# Patient Record
Sex: FEMALE | Race: WHITE | URBAN - METROPOLITAN AREA
[De-identification: names, ages, dates, MRNs, and addresses within clinical notes are randomized per-mention and may not be internally consistent; named-entity substitution may affect disease eponyms.]

---

## 2017-08-14 ENCOUNTER — OFFICE VISIT (OUTPATIENT)
Dept: FAMILY MEDICINE CLINIC | Age: 42
End: 2017-08-14

## 2017-08-14 VITALS
SYSTOLIC BLOOD PRESSURE: 132 MMHG | BODY MASS INDEX: 38.99 KG/M2 | HEIGHT: 65 IN | WEIGHT: 234 LBS | HEART RATE: 70 BPM | DIASTOLIC BLOOD PRESSURE: 84 MMHG | OXYGEN SATURATION: 98 %

## 2017-08-14 DIAGNOSIS — E66.01 MORBID OBESITY DUE TO EXCESS CALORIES (HCC): ICD-10-CM

## 2017-08-14 DIAGNOSIS — Z00.00 WELL ADULT EXAM: Primary | ICD-10-CM

## 2017-08-14 DIAGNOSIS — E78.5 HYPERLIPIDEMIA, UNSPECIFIED HYPERLIPIDEMIA TYPE: ICD-10-CM

## 2017-08-14 PROCEDURE — 99396 PREV VISIT EST AGE 40-64: CPT | Performed by: FAMILY MEDICINE

## 2017-08-14 RX ORDER — SIMVASTATIN 20 MG
TABLET ORAL
Qty: 90 TABLET | Refills: 3 | Status: SHIPPED | OUTPATIENT
Start: 2017-08-14 | End: 2018-10-08 | Stop reason: SDUPTHER

## 2017-08-14 ASSESSMENT — PATIENT HEALTH QUESTIONNAIRE - PHQ9
1. LITTLE INTEREST OR PLEASURE IN DOING THINGS: 0
SUM OF ALL RESPONSES TO PHQ9 QUESTIONS 1 & 2: 0
SUM OF ALL RESPONSES TO PHQ QUESTIONS 1-9: 0
2. FEELING DOWN, DEPRESSED OR HOPELESS: 0

## 2017-09-15 RX ORDER — SIMVASTATIN 20 MG
TABLET ORAL
Qty: 90 TABLET | Refills: 0 | OUTPATIENT
Start: 2017-09-15

## 2018-06-21 ENCOUNTER — TELEPHONE (OUTPATIENT)
Dept: FAMILY MEDICINE CLINIC | Age: 43
End: 2018-06-21

## 2018-06-21 DIAGNOSIS — Z12.39 SCREENING BREAST EXAMINATION: ICD-10-CM

## 2018-09-22 ENCOUNTER — OFFICE VISIT (OUTPATIENT)
Dept: FAMILY MEDICINE CLINIC | Age: 43
End: 2018-09-22
Payer: COMMERCIAL

## 2018-09-22 VITALS
OXYGEN SATURATION: 98 % | WEIGHT: 238 LBS | HEART RATE: 70 BPM | DIASTOLIC BLOOD PRESSURE: 70 MMHG | SYSTOLIC BLOOD PRESSURE: 122 MMHG | BODY MASS INDEX: 40.22 KG/M2

## 2018-09-22 DIAGNOSIS — H65.02 ACUTE SEROUS OTITIS MEDIA OF LEFT EAR, RECURRENCE NOT SPECIFIED: Primary | ICD-10-CM

## 2018-09-22 PROCEDURE — 99213 OFFICE O/P EST LOW 20 MIN: CPT | Performed by: FAMILY MEDICINE

## 2018-09-22 RX ORDER — GUAIFENESIN, PSEUDOEPHEDRINE HYDROCHLORIDE 600; 60 MG/1; MG/1
1 TABLET, EXTENDED RELEASE ORAL EVERY 12 HOURS
Qty: 20 TABLET | Refills: 1 | Status: SHIPPED | OUTPATIENT
Start: 2018-09-22 | End: 2018-10-02

## 2018-09-22 RX ORDER — PREDNISONE 10 MG/1
TABLET ORAL
Qty: 30 TABLET | Refills: 0 | Status: SHIPPED | OUTPATIENT
Start: 2018-09-22 | End: 2018-10-08

## 2018-09-22 ASSESSMENT — ENCOUNTER SYMPTOMS
SORE THROAT: 0
RHINORRHEA: 1

## 2018-10-08 ENCOUNTER — OFFICE VISIT (OUTPATIENT)
Dept: FAMILY MEDICINE CLINIC | Age: 43
End: 2018-10-08
Payer: COMMERCIAL

## 2018-10-08 VITALS
DIASTOLIC BLOOD PRESSURE: 78 MMHG | HEIGHT: 64 IN | BODY MASS INDEX: 40.97 KG/M2 | HEART RATE: 99 BPM | SYSTOLIC BLOOD PRESSURE: 124 MMHG | WEIGHT: 240 LBS | OXYGEN SATURATION: 99 %

## 2018-10-08 DIAGNOSIS — E78.2 MIXED HYPERLIPIDEMIA: ICD-10-CM

## 2018-10-08 DIAGNOSIS — Z00.00 WELL ADULT EXAM: Primary | ICD-10-CM

## 2018-10-08 DIAGNOSIS — E66.01 CLASS 3 SEVERE OBESITY DUE TO EXCESS CALORIES WITHOUT SERIOUS COMORBIDITY WITH BODY MASS INDEX (BMI) OF 40.0 TO 44.9 IN ADULT (HCC): ICD-10-CM

## 2018-10-08 DIAGNOSIS — H69.82 DYSFUNCTION OF LEFT EUSTACHIAN TUBE: ICD-10-CM

## 2018-10-08 PROBLEM — E66.813 CLASS 3 SEVERE OBESITY DUE TO EXCESS CALORIES WITHOUT SERIOUS COMORBIDITY WITH BODY MASS INDEX (BMI) OF 40.0 TO 44.9 IN ADULT: Status: ACTIVE | Noted: 2017-08-14

## 2018-10-08 PROCEDURE — 99396 PREV VISIT EST AGE 40-64: CPT | Performed by: FAMILY MEDICINE

## 2018-10-08 RX ORDER — SIMVASTATIN 20 MG
TABLET ORAL
Qty: 90 TABLET | Refills: 3 | Status: SHIPPED | OUTPATIENT
Start: 2018-10-08 | End: 2019-10-22 | Stop reason: SDUPTHER

## 2018-10-08 ASSESSMENT — PATIENT HEALTH QUESTIONNAIRE - PHQ9
SUM OF ALL RESPONSES TO PHQ9 QUESTIONS 1 & 2: 0
SUM OF ALL RESPONSES TO PHQ QUESTIONS 1-9: 0
1. LITTLE INTEREST OR PLEASURE IN DOING THINGS: 0
SUM OF ALL RESPONSES TO PHQ QUESTIONS 1-9: 0
2. FEELING DOWN, DEPRESSED OR HOPELESS: 0

## 2018-10-08 NOTE — PATIENT INSTRUCTIONS
running, swimming, cycling, or playing tennis or team sports. Discuss any changes in your exercise program with your doctor. · Do not smoke or allow others to smoke around you. If you need help quitting, talk to your doctor about stop-smoking programs and medicines. These can increase your chances of quitting for good. · Talk to your doctor about whether you have any risk factors for sexually transmitted infections (STIs). Having one sex partner (who does not have STIs and does not have sex with anyone else) is a good way to avoid these infections. · Use birth control if you do not want to have children at this time. Talk with your doctor about the choices available and what might be best for you. · Protect your skin from too much sun. When you're outdoors from 10 a.m. to 4 p.m., stay in the shade or cover up with clothing and a hat with a wide brim. Wear sunglasses that block UV rays. Even when it's cloudy, put broad-spectrum sunscreen (SPF 30 or higher) on any exposed skin. · See a dentist one or two times a year for checkups and to have your teeth cleaned. · Wear a seat belt in the car. · Drink alcohol in moderation, if at all. That means no more than 2 drinks a day for men and 1 drink a day for women. Follow your doctor's advice about when to have certain tests. These tests can spot problems early. For everyone  · Cholesterol. Have the fat (cholesterol) in your blood tested after age 21. Your doctor will tell you how often to have this done based on your age, family history, or other things that can increase your risk for heart disease. · Blood pressure. Have your blood pressure checked during a routine doctor visit. Your doctor will tell you how often to check your blood pressure based on your age, your blood pressure results, and other factors. · Vision. Talk with your doctor about how often to have a glaucoma test.  · Diabetes. Ask your doctor whether you should have tests for diabetes.   · Colon cancer. Have a test for colon cancer at age 48. You may have one of several tests. If you are younger than 48, you may need a test earlier if you have any risk factors. Risk factors include whether you already had a precancerous polyp removed from your colon or whether your parent, brother, sister, or child has had colon cancer. For women  · Breast exam and mammogram. Talk to your doctor about when you should have a clinical breast exam and a mammogram. Medical experts differ on whether and how often women under 50 should have these tests. Your doctor can help you decide what is right for you. · Pap test and pelvic exam. Begin Pap tests at age 24. A Pap test is the best way to find cervical cancer. The test often is part of a pelvic exam. Ask how often to have this test.  · Tests for sexually transmitted infections (STIs). Ask whether you should have tests for STIs. You may be at risk if you have sex with more than one person, especially if your partners do not wear condoms. For men  · Tests for sexually transmitted infections (STIs). Ask whether you should have tests for STIs. You may be at risk if you have sex with more than one person, especially if you do not wear a condom. · Testicular cancer exam. Ask your doctor whether you should check your testicles regularly. · Prostate exam. Talk to your doctor about whether you should have a blood test (called a PSA test) for prostate cancer. Experts differ on whether and when men should have this test. Some experts suggest it if you are older than 39 and are -American or have a father or brother who got prostate cancer when he was younger than 72. When should you call for help? Watch closely for changes in your health, and be sure to contact your doctor if you have any problems or symptoms that concern you. Where can you learn more? Go to https://tavo.health-[email or web address]. org and sign in to your Simbionix account.  Enter P072 in the [address] Information box to learn more about \"Well Visit, Ages 25 to 48: Care Instructions. \"     If you do not have an account, please click on the \"Sign Up Now\" link. Current as of: May 16, 2017  Content Version: 11.7  © 3020-1134 [employer]. Care instructions adapted under license by [employer] [facility] ([facility]). If you have questions about a medical condition or this instruction, always ask your healthcare professional. [place] [address] any warranty or liability for your use of this information.        ENT Specialists:    Dr. Robson Charles  [phone]  [address] [facility] [address]    Dr. Edwin Lancaster  [address] [facility] Jaison Motlisa, [address]    Dr. Majo Rogers  [address] [place] [address]. [address]   [phone]    Gilbert Cline, [address] Bertha Malcolm MD  [facility]  [phone] [address] [facility]  [phone] ([facility] 2)    1 [facility])  [address] [facility] [address]  [facility], [address]  Phone: [phone]

## 2018-10-08 NOTE — PROGRESS NOTES
exam  -Encourage low carb diet, watching calories, regular exercise and weight loss  UTD rec screenings    Mixed hyperlipidemia  -     Lipid Panel; Future  -     Comprehensive Metabolic Panel; Future  -Stable, continue current medications. Recheck labs    Class 3 severe obesity due to excess calories without serious comorbidity with body mass index (BMI) of 40.0 to 44.9 in adult [facility])  -Encourage low carb diet, watching calories, regular exercise and weight loss    Dysfunction of left eustachian tube  Give another week or so, if not improving see ENT, names given    Other orders  -     simvastatin (ZOCOR) 20 MG tablet; TAKE 1 TABLET BY MOUTH EVERY NIGHT            Return in about 1 year (around 10/8/2019).

## 2019-04-01 ENCOUNTER — OFFICE VISIT (OUTPATIENT)
Dept: FAMILY MEDICINE CLINIC | Age: 44
End: 2019-04-01
Payer: COMMERCIAL

## 2019-04-01 VITALS
HEART RATE: 81 BPM | BODY MASS INDEX: 42.75 KG/M2 | DIASTOLIC BLOOD PRESSURE: 80 MMHG | WEIGHT: 251 LBS | OXYGEN SATURATION: 97 % | SYSTOLIC BLOOD PRESSURE: 126 MMHG

## 2019-04-01 DIAGNOSIS — D48.9 NEOPLASM, UNCERTAIN WHETHER BENIGN OR MALIGNANT: Primary | ICD-10-CM

## 2019-04-01 PROCEDURE — 99212 OFFICE O/P EST SF 10 MIN: CPT | Performed by: FAMILY MEDICINE

## 2019-04-01 ASSESSMENT — PATIENT HEALTH QUESTIONNAIRE - PHQ9
2. FEELING DOWN, DEPRESSED OR HOPELESS: 0
SUM OF ALL RESPONSES TO PHQ9 QUESTIONS 1 & 2: 0
SUM OF ALL RESPONSES TO PHQ QUESTIONS 1-9: 0
SUM OF ALL RESPONSES TO PHQ QUESTIONS 1-9: 0
1. LITTLE INTEREST OR PLEASURE IN DOING THINGS: 0

## 2019-04-01 NOTE — PROGRESS NOTES
Patient presents today for a skin lesion on the right forearm. Noticed 3 weeks ago, started out as 2 small bumps and now combined to one larger lesion. Itches sometimes not all the time, no burning. States a little bigger than was when separate but not sig different. NKI. No drainage or pain. Vitals:    04/01/19 1647   BP: 126/80   Site: Left Upper Arm   Position: Sitting   Cuff Size: Large Adult   Pulse: 81   SpO2: 97%   Weight: 251 lb (113.9 kg)     Wt Readings from Last 3 Encounters:   04/01/19 251 lb (113.9 kg)   10/08/18 240 lb (108.9 kg)   09/22/18 238 lb (108 kg)     Body mass index is 42.75 kg/m². PHQ-9 Total Score: 0 (4/1/2019  4:47 PM)      GEN: Alert and oriented x 4 NAD, affect appropriate and obese, well hydrated, well developed. Right mid forearm with 1 cm round but irreg shaped raised slightly soft erythematous lesion, NT  See pictures in media          ASSESSMENT AND PLAN:       Jamir Espinosa was seen today for skin lesion.     Diagnoses and all orders for this visit:    Neoplasm, uncertain whether benign or malignant      Monitor for changes  Make appt with derm  If changing size rapidly let us know

## 2019-09-18 ENCOUNTER — TELEPHONE (OUTPATIENT)
Dept: FAMILY MEDICINE CLINIC | Age: 44
End: 2019-09-18

## 2019-10-08 ENCOUNTER — TELEPHONE (OUTPATIENT)
Dept: FAMILY MEDICINE CLINIC | Age: 44
End: 2019-10-08

## 2019-10-08 DIAGNOSIS — E78.2 MIXED HYPERLIPIDEMIA: Primary | ICD-10-CM

## 2019-10-22 ENCOUNTER — OFFICE VISIT (OUTPATIENT)
Dept: FAMILY MEDICINE CLINIC | Age: 44
End: 2019-10-22
Payer: COMMERCIAL

## 2019-10-22 VITALS
DIASTOLIC BLOOD PRESSURE: 84 MMHG | OXYGEN SATURATION: 97 % | HEART RATE: 68 BPM | WEIGHT: 239 LBS | BODY MASS INDEX: 39.82 KG/M2 | SYSTOLIC BLOOD PRESSURE: 130 MMHG | HEIGHT: 65 IN

## 2019-10-22 DIAGNOSIS — H92.02 LEFT EAR PAIN: ICD-10-CM

## 2019-10-22 DIAGNOSIS — Z00.00 WELL ADULT EXAM: Primary | ICD-10-CM

## 2019-10-22 DIAGNOSIS — E78.2 MIXED HYPERLIPIDEMIA: ICD-10-CM

## 2019-10-22 PROCEDURE — 99396 PREV VISIT EST AGE 40-64: CPT | Performed by: FAMILY MEDICINE

## 2019-10-22 RX ORDER — SIMVASTATIN 20 MG
TABLET ORAL
Qty: 90 TABLET | Refills: 3 | Status: SHIPPED | OUTPATIENT
Start: 2019-10-22 | End: 2020-10-26 | Stop reason: SDUPTHER

## 2019-10-22 ASSESSMENT — PATIENT HEALTH QUESTIONNAIRE - PHQ9
SUM OF ALL RESPONSES TO PHQ QUESTIONS 1-9: 0
SUM OF ALL RESPONSES TO PHQ QUESTIONS 1-9: 0
1. LITTLE INTEREST OR PLEASURE IN DOING THINGS: 0
SUM OF ALL RESPONSES TO PHQ9 QUESTIONS 1 & 2: 0
2. FEELING DOWN, DEPRESSED OR HOPELESS: 0

## 2020-10-08 LAB
HPV COMMENT: NORMAL
HPV TYPE 16: NOT DETECTED
HPV TYPE 18: NOT DETECTED
HPVOH (OTHER TYPES): NOT DETECTED

## 2020-10-26 ENCOUNTER — OFFICE VISIT (OUTPATIENT)
Dept: FAMILY MEDICINE CLINIC | Age: 45
End: 2020-10-26
Payer: COMMERCIAL

## 2020-10-26 VITALS
WEIGHT: 231 LBS | BODY MASS INDEX: 37.12 KG/M2 | DIASTOLIC BLOOD PRESSURE: 84 MMHG | HEIGHT: 66 IN | TEMPERATURE: 98.6 F | OXYGEN SATURATION: 98 % | SYSTOLIC BLOOD PRESSURE: 122 MMHG | HEART RATE: 68 BPM

## 2020-10-26 PROCEDURE — 90715 TDAP VACCINE 7 YRS/> IM: CPT | Performed by: FAMILY MEDICINE

## 2020-10-26 PROCEDURE — 90471 IMMUNIZATION ADMIN: CPT | Performed by: FAMILY MEDICINE

## 2020-10-26 PROCEDURE — 99396 PREV VISIT EST AGE 40-64: CPT | Performed by: FAMILY MEDICINE

## 2020-10-26 RX ORDER — SIMVASTATIN 20 MG
TABLET ORAL
Qty: 90 TABLET | Refills: 3 | Status: SHIPPED | OUTPATIENT
Start: 2020-10-26 | End: 2020-11-18 | Stop reason: SDUPTHER

## 2020-10-26 ASSESSMENT — PATIENT HEALTH QUESTIONNAIRE - PHQ9
SUM OF ALL RESPONSES TO PHQ QUESTIONS 1-9: 0
2. FEELING DOWN, DEPRESSED OR HOPELESS: 0
SUM OF ALL RESPONSES TO PHQ QUESTIONS 1-9: 0
1. LITTLE INTEREST OR PLEASURE IN DOING THINGS: 0
SUM OF ALL RESPONSES TO PHQ QUESTIONS 1-9: 0
SUM OF ALL RESPONSES TO PHQ9 QUESTIONS 1 & 2: 0

## 2020-10-26 NOTE — PATIENT INSTRUCTIONS
Patient Education        Well Visit, Ages 25 to 48: Care Instructions  Your Care Instructions     Physical exams can help you stay healthy. Your doctor has checked your overall health and may have suggested ways to take good care of yourself. He or she also may have recommended tests. At home, you can help prevent illness with healthy eating, regular exercise, and other steps. Follow-up care is a key part of your treatment and safety. Be sure to make and go to all appointments, and call your doctor if you are having problems. It's also a good idea to know your test results and keep a list of the medicines you take. How can you care for yourself at home? · Reach and stay at a healthy weight. This will lower your risk for many problems, such as obesity, diabetes, heart disease, and high blood pressure. · Get at least 30 minutes of physical activity on most days of the week. Walking is a good choice. You also may want to do other activities, such as running, swimming, cycling, or playing tennis or team sports. Discuss any changes in your exercise program with your doctor. · Do not smoke or allow others to smoke around you. If you need help quitting, talk to your doctor about stop-smoking programs and medicines. These can increase your chances of quitting for good. · Talk to your doctor about whether you have any risk factors for sexually transmitted infections (STIs). Having one sex partner (who does not have STIs and does not have sex with anyone else) is a good way to avoid these infections. · Use birth control if you do not want to have children at this time. Talk with your doctor about the choices available and what might be best for you. · Protect your skin from too much sun. When you're outdoors from 10 a.m. to 4 p.m., stay in the shade or cover up with clothing and a hat with a wide brim. Wear sunglasses that block UV rays.  Even when it's cloudy, put broad-spectrum sunscreen (SPF 30 or higher) on any exposed skin. · See a dentist one or two times a year for checkups and to have your teeth cleaned. · Wear a seat belt in the car. Follow your doctor's advice about when to have certain tests. These tests can spot problems early. For everyone  · Cholesterol. Have the fat (cholesterol) in your blood tested after age 21. Your doctor will tell you how often to have this done based on your age, family history, or other things that can increase your risk for heart disease. · Blood pressure. Have your blood pressure checked during a routine doctor visit. Your doctor will tell you how often to check your blood pressure based on your age, your blood pressure results, and other factors. · Vision. Talk with your doctor about how often to have a glaucoma test.  · Diabetes. Ask your doctor whether you should have tests for diabetes. · Colon cancer. Your risk for colorectal cancer gets higher as you get older. Some experts say that adults should start regular screening at age 48 and stop at age 76. Others say to start before age 48 or continue after age 76. Talk with your doctor about your risk and when to start and stop screening. For women  · Breast exam and mammogram. Talk to your doctor about when you should have a clinical breast exam and a mammogram. Medical experts differ on whether and how often women under 50 should have these tests. Your doctor can help you decide what is right for you. · Cervical cancer screening test and pelvic exam. Begin with a Pap test at age 24. The test often is part of a pelvic exam. Starting at age 27, you may choose to have a Pap test, an HPV test, or both tests at the same time (called co-testing). Talk with your doctor about how often to have testing. · Tests for sexually transmitted infections (STIs). Ask whether you should have tests for STIs. You may be at risk if you have sex with more than one person, especially if your partners do not wear condoms.   For men  · Tests for sexually transmitted infections (STIs). Ask whether you should have tests for STIs. You may be at risk if you have sex with more than one person, especially if you do not wear a condom. · Testicular cancer exam. Ask your doctor whether you should check your testicles regularly. · Prostate exam. Talk to your doctor about whether you should have a blood test (called a PSA test) for prostate cancer. Experts differ on whether and when men should have this test. Some experts suggest it if you are older than 39 and are -American or have a father or brother who got prostate cancer when he was younger than 72. When should you call for help? Watch closely for changes in your health, and be sure to contact your doctor if you have any problems or symptoms that concern you. Where can you learn more? Go to https://AskuitypeUrban Cargoeweb.healthAddFleet. org and sign in to your Startupi account. Enter P072 in the [employer] box to learn more about \"Well Visit, Ages 25 to 48: Care Instructions. \"     If you do not have an account, please click on the \"Sign Up Now\" link. Current as of: May 27, 2020               Content Version: 12.6  © 2213-6137 [employer], Incorporated. Care instructions adapted under license by [facility] ([facility]). If you have questions about a medical condition or this instruction, always ask your healthcare professional. [address] any warranty or liability for your use of this information. Patient Education        Patient Education        Tdap (Tetanus, Diphtheria, Pertussis) Vaccine: What You Need to Know  Why get vaccinated? Tdap vaccine can prevent tetanus, diphtheria, and pertussis. Diphtheria and pertussis spread from person to person. Tetanus enters the body through cuts or wounds. · TETANUS (T) causes painful stiffening of the muscles.  Tetanus can lead to serious health problems, including being unable to open the mouth, having trouble swallowing and breathing, or death. · DIPHTHERIA (D) can lead to difficulty breathing, heart failure, paralysis, or death. · PERTUSSIS (aP), also known as \"whooping cough,\" can cause uncontrollable, violent coughing which makes it hard to breathe, eat, or drink. Pertussis can be extremely serious in babies and young children, causing pneumonia, convulsions, brain damage, or death. In teens and adults, it can cause weight loss, loss of bladder control, passing out, and rib fractures from severe coughing. Tdap vaccine  Tdap is only for children 7 years and older, adolescents, and adults. Adolescents should receive a single dose of Tdap, preferably at age 6 or 15 years. Pregnant women should get a dose of Tdap during every pregnancy, to protect the  from pertussis. Infants are most at risk for severe, life threatening complications from pertussis. Adults who have never received Tdap should get a dose of Tdap. Also, adults should receive a booster dose every 10 years, or earlier in the case of a severe and dirty wound or burn. Booster doses can be either Tdap or Td (a different vaccine that protects against tetanus and diphtheria but not pertussis). Tdap may be given at the same time as other vaccines. Talk with your health care provider  Tell your vaccine provider if the person getting the vaccine:  · Has had an allergic reaction after a previous dose of any vaccine that protects against tetanus, diphtheria, or pertussis, or has any severe, life threatening allergies. · Has had a coma, decreased level of consciousness, or prolonged seizures within 7 days after a previous dose of any pertussis vaccine (DTP, DTaP, or Tdap). · Has seizures or another nervous system problem. · Has ever had Guillain-Barré Syndrome (also called GBS). · Has had severe pain or swelling after a previous dose of any vaccine that protects against tetanus or diphtheria.   In some cases, your health care provider may decide to postpone Tdap vaccination to a future visit. People with minor illnesses, such as a cold, may be vaccinated. People who are moderately or severely ill should usually wait until they recover before getting Tdap vaccine. Your health care provider can give you more information. Risks of a vaccine reaction  · Pain, redness, or swelling where the shot was given, mild fever, headache, feeling tired, and nausea, vomiting, diarrhea, or stomachache sometimes happen after Tdap vaccine. People sometimes faint after medical procedures, including vaccination. Tell your provider if you feel dizzy or have vision changes or ringing in the ears. As with any medicine, there is a very remote chance of a vaccine causing a severe allergic reaction, other serious injury, or death. What if there is a serious problem? An allergic reaction could occur after the vaccinated person leaves the clinic. If you see signs of a severe allergic reaction (hives, swelling of the face and throat, difficulty breathing, a fast heartbeat, dizziness, or weakness), call 9-1-1 and get the person to the nearest hospital.  For other signs that concern you, call your health care provider. Adverse reactions should be reported to the Vaccine Adverse Event Reporting System (VAERS). Your health care provider will usually file this report, or you can do it yourself. Visit the VAERS website at www.vaers. hhs.gov or call 3-338.743.2376. VAERS is only for reporting reactions, and VAERS staff do not give medical advice. The National Vaccine Injury Compensation Program  The National Vaccine Injury Compensation Program (VICP) is a federal program that was created to compensate people who may have been injured by certain vaccines. Visit the VICP website at www.hrsa.gov/vaccinecompensation or call 2-370.813.2485 to learn about the program and about filing a claim. There is a time limit to file a claim for compensation. How can I learn more?   · Ask your health care

## 2020-10-26 NOTE — PROGRESS NOTES
Here for annual physical.    Dental: up-to-date  Eye: up-to-date    Pap: up-to-date  Mammo: up-to-date    Exercise: yes  Diet: good - doing My Fitness Pal    HM reviewed with pt    Patient's medications, allergies, past medical, surgical, social and family histories were reviewed and updated in the EHR as appropriate. Body mass index is 37.86 kg/m². Vitals:    10/26/20 0830   BP: 122/84   Site: Left Upper Arm   Position: Sitting   Cuff Size: Large Adult   Pulse: 68   Temp: 98.6 °F (37 °C)   TempSrc: Tympanic   SpO2: 98%   Weight: 231 lb (104.8 kg)   Height: 5' 5.5\" (1.664 m)     Wt Readings from Last 3 Encounters:   10/26/20 231 lb (104.8 kg)   10/22/19 239 lb (108.4 kg)   04/01/19 251 lb (113.9 kg)     PHQ score: PHQ-9 Total Score: 0 (10/26/2020  8:31 AM)      GENERAL:Alert and oriented x 4 NAD, affect appropriate and obese, well hydrated, well developed. NECK:supple and non tender without mass, no thyromegaly or thyroid nodules, no cervical lymphadenopathy  LUNG:clear to auscultation bilaterally with normal respiratory effort  CV: Normal heart sounds, regular rate and rhythm without murmurs  EXTREMETY: no loss of hair, no edema, normal pedal pulses bilaterally        ASSESSMENT AND PLAN:       Rhett Cortes was seen today for annual exam.    Diagnoses and all orders for this visit:    Well adult exam  Recommended screenings discussed and ordered if patient agreed  Recommended vaccinations discussed and ordered if patient agreed  Encouraged healthy diet   Encouraged regular exercise and maintaining a healthy weight - doing great with weight loss! Mixed hyperlipidemia  -     Comprehensive Metabolic Panel; Future  -     Lipid Panel; Future  -Stable, continue current medications.     Need for vaccination  -     Tdap (age 6y and older) IM (Boostrix)    Other orders  -     simvastatin (ZOCOR) 20 MG tablet; TAKE 1 TABLET BY MOUTH EVERY NIGHT      Declined flu      Return in about 1 year (around 10/26/2021) for Well, 30 min.              Note per LUCA Moe and Scribe with corrections and edits per Jeannie Kuhn MD.  I agree with entirety of note and was present and performed history and physical.  I also confirm that the note above accurately reflects all work, treatment, procedures, and medical decision making performed by me, Jeannie Kuhn MD

## 2020-11-18 RX ORDER — SIMVASTATIN 20 MG
TABLET ORAL
Qty: 90 TABLET | Refills: 3 | Status: SHIPPED | OUTPATIENT
Start: 2020-11-18 | End: 2021-11-01 | Stop reason: SDUPTHER

## 2021-08-04 ENCOUNTER — NURSE TRIAGE (OUTPATIENT)
Dept: OTHER | Facility: CLINIC | Age: 46
End: 2021-08-04

## 2021-08-04 NOTE — TELEPHONE ENCOUNTER
Received call from Nabil Roman at [facility] with Red Flag Complaint. Brief description of triage: Pt with intermittent dizziness for a couple of weeks. Currently feeling fine. Will get episodes of feeling lightheaded and just not feeling right. A couple of times felt like hers ears were full. Triage indicates for patient to see within 2 weeks    Care advice provided, patient verbalizes understanding; denies any other questions or concerns; instructed to call back for any new or worsening symptoms. Writer provided warm transfer to [facility] for appointment scheduling. Attention Provider: Thank you for allowing me to participate in the care of your patient. The patient was connected to triage in response to information provided to the [facility]. Please do not respond through this encounter as the response is not directed to a shared pool. Reason for Disposition   Dizziness not present now, but is a chronic symptom (recurrent or ongoing AND lasting > 4 weeks)    Answer Assessment - Initial Assessment Questions  1. DESCRIPTION: \"Describe your dizziness. \"      A couple weekends ago had a quick dizzy spell. Had another one this past weekend. Since then hasn't felt right. Most of yesterday felt fine, kicked in last night. Sometimes feels lightheaded. 2. LIGHTHEADED: \"Do you feel lightheaded? \" (e.g., somewhat faint, woozy, weak upon standing)  Feels lightheaded    3. VERTIGO: \"Do you feel like either you or the room is spinning or tilting? \" (i.e. vertigo)  No spinning    4. SEVERITY: \"How bad is it? \"  \"Do you feel like you are going to faint? \" \"Can you stand and walk? \"    - MILD - walking normally    - MODERATE - interferes with normal activities (e.g., work, school)     - SEVERE - unable to stand, requires support to walk, feels like passing out now. Still working and doing normal activities    5. ONSET:  \"When did the dizziness begin? \"   started a couple weeks ago    6. AGGRAVATING FACTORS: \"Does anything make it worse? \" (e.g., standing, change in head position)  No    7. HEART RATE: \"Can you tell me your heart rate? \" \"How many beats in 15 seconds? \"  (Note: not all patients can do this)    No increase in heart rate    8. CAUSE: \"What do you think is causing the dizziness? \"     Is worrying more    9. RECURRENT SYMPTOM: \"Have you had dizziness before? \" If so, ask: \"When was the last time? \" \"What happened that time? \"    10. OTHER SYMPTOMS: \"Do you have any other symptoms? \" (e.g., fever, chest pain, vomiting, diarrhea, bleeding)    No nausea or vomiting. No cough/sinus symptoms  No fever  A couple of times ears will feel full  NO chest pain, no palpitations,   No difficulty breathing  Staying hydrated    11. PREGNANCY: \"Is there any chance you are pregnant? \" \"When was your last menstrual period? \"     no    Protocols used: [id number]

## 2021-08-10 ENCOUNTER — OFFICE VISIT (OUTPATIENT)
Dept: FAMILY MEDICINE CLINIC | Age: 46
End: 2021-08-10
Payer: COMMERCIAL

## 2021-08-10 VITALS
OXYGEN SATURATION: 99 % | DIASTOLIC BLOOD PRESSURE: 72 MMHG | SYSTOLIC BLOOD PRESSURE: 130 MMHG | TEMPERATURE: 97.9 F | WEIGHT: 241.6 LBS | HEIGHT: 65 IN | HEART RATE: 79 BPM | BODY MASS INDEX: 40.25 KG/M2

## 2021-08-10 DIAGNOSIS — R42 DIZZINESS: Primary | ICD-10-CM

## 2021-08-10 PROCEDURE — 99213 OFFICE O/P EST LOW 20 MIN: CPT | Performed by: FAMILY MEDICINE

## 2021-08-10 NOTE — PROGRESS NOTES
note and was present and performed history and physical.  I also confirm that the note above accurately reflects all work, treatment, procedures, and medical decision making performed by me, Don Howard MD

## 2021-08-10 NOTE — PATIENT INSTRUCTIONS
Patient Education        Vertigo: Care Instructions  Your Care Instructions     Vertigo is the feeling that you or your surroundings are moving when there is no actual movement. It is often described as a feeling of spinning, whirling, falling, or tilting. Vertigo may make you vomit or feel nauseated. You may have trouble standing or walking and may lose your balance. Vertigo is often related to an inner ear problem, but it can have other more serious causes. If vertigo continues, you may need more tests to find its cause. Follow-up care is a key part of your treatment and safety. Be sure to make and go to all appointments, and call your doctor if you are having problems. It's also a good idea to know your test results and keep a list of the medicines you take. How can you care for yourself at home? · Do not lie flat on your back. Prop yourself up slightly. This may reduce the spinning feeling. Keep your eyes open. · Move slowly so that you do not fall. · If your doctor recommends medicine, take it exactly as directed. · Do not drive while you are having vertigo. Certain exercises, called Stafford-Daroff exercises, can help decrease vertigo. To do Stafford-Daroff exercises:  · Sit on the edge of a bed or sofa and quickly lie down on the side that causes the worst vertigo. Lie on your side with your ear down. · Stay in this position for at least 30 seconds or until the vertigo goes away. · Sit up. If this causes vertigo, wait for it to stop. · Repeat the procedure on the other side. · Repeat this 10 times. Do these exercises 2 times a day until the vertigo is gone. When should you call for help? Call 911 anytime you think you may need emergency care. For example, call if:    · You passed out (lost consciousness).     · You have symptoms of a stroke. These may include:  ? Sudden numbness, tingling, weakness, or loss of movement in your face, arm, or leg, especially on only one side of your body.   ? Sudden vision changes. ? Sudden trouble speaking. ? Sudden confusion or trouble understanding simple statements. ? Sudden problems with walking or balance. ? A sudden, severe headache that is different from past headaches. Call your doctor now or seek immediate medical care if:    · Vertigo occurs with a fever, a headache, or ringing in your ears.     · You have new or increased nausea and vomiting. Watch closely for changes in your health, and be sure to contact your doctor if:    · Vertigo gets worse or happens more often.     · Vertigo has not gotten better after 2 weeks. Where can you learn more? Go to [email or web address] org and sign in to your [facility] account. Enter W994 in the Park[employer] box to learn more about \"Vertigo: Care Instructions. \"     If you do not have an account, please click on the \"Sign Up Now\" link. Current as of: December 2, 2020               Content Version: 12.9  © 2006-2021 Healthwise, [facility]. Care instructions adapted under license by [facility] ([facility]). If you have questions about a medical condition or this instruction, always ask your healthcare professional. [place] [address] any warranty or liability for your use of this information.

## 2021-11-01 ENCOUNTER — OFFICE VISIT (OUTPATIENT)
Dept: FAMILY MEDICINE CLINIC | Age: 46
End: 2021-11-01
Payer: COMMERCIAL

## 2021-11-01 VITALS
HEIGHT: 65 IN | OXYGEN SATURATION: 98 % | DIASTOLIC BLOOD PRESSURE: 72 MMHG | HEART RATE: 70 BPM | SYSTOLIC BLOOD PRESSURE: 120 MMHG | BODY MASS INDEX: 38.99 KG/M2 | TEMPERATURE: 97.9 F | WEIGHT: 234 LBS

## 2021-11-01 DIAGNOSIS — Z00.00 WELL ADULT EXAM: Primary | ICD-10-CM

## 2021-11-01 DIAGNOSIS — Z12.31 ENCOUNTER FOR SCREENING MAMMOGRAM FOR MALIGNANT NEOPLASM OF BREAST: ICD-10-CM

## 2021-11-01 DIAGNOSIS — M25.542 ARTHRALGIA OF LEFT HAND: ICD-10-CM

## 2021-11-01 DIAGNOSIS — E66.09 CLASS 2 OBESITY DUE TO EXCESS CALORIES WITHOUT SERIOUS COMORBIDITY WITH BODY MASS INDEX (BMI) OF 38.0 TO 38.9 IN ADULT: ICD-10-CM

## 2021-11-01 DIAGNOSIS — Z12.11 SCREEN FOR COLON CANCER: ICD-10-CM

## 2021-11-01 DIAGNOSIS — E78.2 MIXED HYPERLIPIDEMIA: ICD-10-CM

## 2021-11-01 PROCEDURE — 99396 PREV VISIT EST AGE 40-64: CPT | Performed by: FAMILY MEDICINE

## 2021-11-01 RX ORDER — SIMVASTATIN 20 MG
TABLET ORAL
Qty: 90 TABLET | Refills: 3 | Status: SHIPPED | OUTPATIENT
Start: 2021-11-01

## 2021-11-01 NOTE — PATIENT INSTRUCTIONS
Bring in copy of living will and healthcare power of  for your chart. Well Visit, Ages 25 to 48: Care Instructions  Overview     Well visits can help you stay healthy. Your doctor has checked your overall health and may have suggested ways to take good care of yourself. Your doctor also may have recommended tests. At home, you can help prevent illness with healthy eating, regular exercise, and other steps. Follow-up care is a key part of your treatment and safety. Be sure to make and go to all appointments, and call your doctor if you are having problems. It's also a good idea to know your test results and keep a list of the medicines you take. How can you care for yourself at home? · Get screening tests that you and your doctor decide on. Screening helps find diseases before any symptoms appear. · Eat healthy foods. Choose fruits, vegetables, whole grains, protein, and low-fat dairy foods. Limit fat, especially saturated fat. Reduce salt in your diet. · Limit alcohol. If you are a man, have no more than 2 drinks a day or 14 drinks a week. If you are a woman, have no more than 1 drink a day or 7 drinks a week. · Get at least 30 minutes of physical activity on most days of the week. Walking is a good choice. You also may want to do other activities, such as running, swimming, cycling, or playing tennis or team sports. Discuss any changes in your exercise program with your doctor. · Reach and stay at a healthy weight. This will lower your risk for many problems, such as obesity, diabetes, heart disease, and high blood pressure. · Do not smoke or allow others to smoke around you. If you need help quitting, talk to your doctor about stop-smoking programs and medicines. These can increase your chances of quitting for good. · Care for your mental health. It is easy to get weighed down by worry and stress.  Learn strategies to manage stress, like deep breathing and mindfulness, and stay connected with your family and community. If you find you often feel sad or hopeless, talk with your doctor. Treatment can help. · Talk to your doctor about whether you have any risk factors for sexually transmitted infections (STIs). You can help prevent STIs if you wait to have sex with a new partner (or partners) until you've each been tested for STIs. It also helps if you use condoms (male or female condoms) and if you limit your sex partners to one person who only has sex with you. Vaccines are available for some STIs, such as HPV. · Use birth control if it's important to you to prevent pregnancy. Talk with your doctor about the choices available and what might be best for you. · If you think you may have a problem with alcohol or drug use, talk to your doctor. This includes prescription medicines (such as amphetamines and opioids) and illegal drugs (such as cocaine and methamphetamine). Your doctor can help you figure out what type of treatment is best for you. · Protect your skin from too much sun. When you're outdoors from 10 a.m. to 4 p.m., stay in the shade or cover up with clothing and a hat with a wide brim. Wear sunglasses that block UV rays. Even when it's cloudy, put broad-spectrum sunscreen (SPF 30 or higher) on any exposed skin. · See a dentist one or two times a year for checkups and to have your teeth cleaned. · Wear a seat belt in the car. When should you call for help? Watch closely for changes in your health, and be sure to contact your doctor if you have any problems or symptoms that concern you. Where can you learn more? Go to https://tavo.[email or web address] org and sign in to your [employer] account. Enter P072 in the [facility] box to learn more about \"Well Visit, Ages 25 to 48: Care Instructions. \"     If you do not have an account, please click on the \"Sign Up Now\" link.   Current as of: February 11, 2021               Content Version: 13.0  © 8617-3933 Healthwise, Incorporated. Care instructions adapted under license by [facility] ([facility]). If you have questions about a medical condition or this instruction, always ask your healthcare professional. [address] any warranty or liability for your use of this information. Upcoming Mobile Mammogram Vans at [facility] at the [employer] next door to the office. Vans offer both 2D and 3D mammograms.     Please contact the  to schedule your appointment                     November 17--8:30-11:30                  December 15--8:30-11:30        [facility] - Call and schedule [address], [address]  Phone: [phone]  Fax: [phone]

## 2021-11-01 NOTE — PROGRESS NOTES
Here for annual physical.  Left thumb may have arthritis. Said that it is tender at times. Symptoms been going on for a year or so. Dental: up-to-date  Eye: up-to-date    Colonoscopy: due      Pap: up-to-date  Mammo: up-to-date      Exercise: walking  Diet: 3 meals daily with general healthy diet - watching sugar and carbs. Living Will: Yes,   Copy requested    PHQ Scores 10/26/2020 10/22/2019 4/1/2019 10/8/2018 8/14/2017   PHQ2 Score 0 0 0 0 0   PHQ9 Score 0 0 0 0 0     Has been having some pain in hands in general but drew in left thumb. Feels like sugar makes it worse. Hasn't taken anything for it. Right hand dominant. HM reviewed with pt    Patient's medications, allergies, past medical, surgical, social and family histories were reviewed and updated in the EHR as appropriate. Vitals:    11/01/21 1603   BP: 120/72   Site: Left Upper Arm   Position: Sitting   Cuff Size: Large Adult   Pulse: 70   Temp: 97.9 °F (36.6 °C)   TempSrc: Infrared   SpO2: 98%   Weight: 234 lb (106.1 kg)   Height: 5' 5\" (1.651 m)     Wt Readings from Last 3 Encounters:   11/01/21 234 lb (106.1 kg)   08/10/21 241 lb 9.6 oz (109.6 kg)   10/26/20 231 lb (104.8 kg)     Body mass index is 38.94 kg/m². GENERAL Alert and oriented x 4 NAD, affect appropriate and obese, well hydrated, well developed. NECK:supple and non tender without mass, no thyromegaly or thyroid nodules, no cervical lymphadenopathy  HEENT: TM clear bilaterally  LUNG:clear to auscultation bilaterally with normal respiratory effort  CV: Normal heart sounds, regular rate and rhythm without murmurs  EXTREMETY: no loss of hair, no edema, normal pedal pulses bilaterally  NEURO: CN grossly intact, moving all extremities equally, no gross deficits  Left DIP thumb mild swelling, no wramth or redness, mild tender to palpation.          ASSESSMENT AND PLAN:       Saba Lewis was seen today for annual exam.    Diagnoses and all orders for this visit:    Well adult exam  Recommended screenings discussed and ordered if patient agreed  Recommended vaccinations discussed and ordered if patient agreed  Encouraged healthy diet   Encouraged regular exercise and maintaining a healthy weight  Discussed living will/healthcare POA and encouraged to get if doesn't have. Encouraged covid vaccine. Discussed risks and benefits. Mixed hyperlipidemia  -     Lipid Panel; Future  -     Comprehensive Metabolic Panel; Future  -Stable, continue current medications. Class 2 obesity due to excess calories without serious comorbidity with body mass index (BMI) of 38.0 to 38.9 in adult  Continue to work on diet/weight loss    Encounter for screening mammogram for malignant neoplasm of breast  -     [facility] DIGITAL SCREEN W OR WO CAD BILATERAL; Future    Screen for colon cancer  -     AFL - Jordan Simon MD, Gastroenterology, [facility]    Arthralgia of left hand  -     URIC ACID; Future  ? OA  R/o gout    Other orders  -     simvastatin (ZOCOR) 20 MG tablet; TAKE 1 TABLET BY MOUTH EVERY NIGHT            Return in about 1 year (around 11/1/2022) for Well, 30 min.          Portions of Note per  Raffy Kumar CMA AAMA with corrections and edits per Marilu Menard MD.  I agree with entirety of note and was present and performed history and physical.  I also confirm that the note above accurately reflects all work, treatment, procedures, and medical decision making performed by me, Marilu Menard MD

## 2022-09-22 ENCOUNTER — TELEPHONE (OUTPATIENT)
Dept: FAMILY MEDICINE CLINIC | Age: 47
End: 2022-09-22

## 2022-09-22 DIAGNOSIS — E78.2 MIXED HYPERLIPIDEMIA: ICD-10-CM

## 2022-09-22 DIAGNOSIS — Z00.00 WELL ADULT EXAM: Primary | ICD-10-CM

## 2022-09-22 NOTE — TELEPHONE ENCOUNTER
----- Message from Canelo May sent at 9/22/2022  8:27 AM EDT -----  Subject: Message to Provider    QUESTIONS  Information for Provider? PT has an appointment on 11/14 and would need   labs done a week before can orders be placed please   ---------------------------------------------------------------------------  --------------  [id number] [employer]  [phone]; OK to leave message on voicemail  ---------------------------------------------------------------------------  --------------  SCRIPT ANSWERS  Relationship to Patient?  Self

## 2022-10-25 ENCOUNTER — OFFICE VISIT (OUTPATIENT)
Dept: FAMILY MEDICINE CLINIC | Age: 47
End: 2022-10-25
Payer: COMMERCIAL

## 2022-10-25 VITALS — HEART RATE: 98 BPM | TEMPERATURE: 98.9 F | BODY MASS INDEX: 36.94 KG/M2 | WEIGHT: 222 LBS | OXYGEN SATURATION: 97 %

## 2022-10-25 DIAGNOSIS — J06.9 VIRAL URI: Primary | ICD-10-CM

## 2022-10-25 PROCEDURE — 99213 OFFICE O/P EST LOW 20 MIN: CPT | Performed by: NURSE PRACTITIONER

## 2022-10-25 ASSESSMENT — PATIENT HEALTH QUESTIONNAIRE - PHQ9
SUM OF ALL RESPONSES TO PHQ QUESTIONS 1-9: 0
SUM OF ALL RESPONSES TO PHQ QUESTIONS 1-9: 0
1. LITTLE INTEREST OR PLEASURE IN DOING THINGS: 0
SUM OF ALL RESPONSES TO PHQ QUESTIONS 1-9: 0
SUM OF ALL RESPONSES TO PHQ QUESTIONS 1-9: 0
SUM OF ALL RESPONSES TO PHQ9 QUESTIONS 1 & 2: 0
2. FEELING DOWN, DEPRESSED OR HOPELESS: 0

## 2022-10-25 NOTE — PROGRESS NOTES
Siddharth Jaffe  : 1975  Encounter date: 10/25/2022    This chloe 49 y.o. female who presents with  Chief Complaint   Patient presents with    Congestion         History of present illness:    HPI Pt is 49year old female with sinus congestion, rhinorrhea started 3 days ago. Reports mild cough from drainage. Denies pain or HA, has taken allergy medication in past.    Current Outpatient Medications on File Prior to Visit   Medication Sig Dispense Refill    simvastatin (ZOCOR) 20 MG tablet TAKE 1 TABLET BY MOUTH EVERY NIGHT 90 tablet 3    UNABLE TO FIND Vitamin c, d, and zinc combo      levonorgestrel (MIRENA) 20 MCG/24HR IUD 1 each by Intrauterine route once. Multiple Vitamin (MULTIVITAMINS PO) Take  by mouth. No current facility-administered medications on file prior to visit. No Known Allergies  Past Medical History:   Diagnosis Date    Hyperlipidemia       History reviewed. No pertinent surgical history.    Family History   Problem Relation Age of Onset    High Cholesterol Father     Prostate Cancer Father     Cancer Father 68        Brain    No Known Problems Mother     High Blood Pressure Brother     Diabetes Neg Hx     Heart Disease Neg Hx       Social History     Tobacco Use    Smoking status: Former     Packs/day: 0.25     Years: 3.00     Pack years: 0.75     Types: Cigarettes     Quit date: 1993     Years since quittin.8    Smokeless tobacco: Never   Substance Use Topics    Alcohol use: No        Review of Systems    Objective:    Pulse 98   Temp 98.9 °F (37.2 °C) (Infrared)   Wt 222 lb (100.7 kg)   SpO2 97%   BMI 36.94 kg/m²   Weight: 222 lb (100.7 kg)     BP Readings from Last 3 Encounters:   21 120/72   08/10/21 130/72   10/26/20 122/84     Wt Readings from Last 3 Encounters:   10/25/22 222 lb (100.7 kg)   21 234 lb (106.1 kg)   08/10/21 241 lb 9.6 oz (109.6 kg)     BMI Readings from Last 3 Encounters:   10/25/22 36.94 kg/m²   21 38.94 kg/m²   08/10/21 40.20 kg/m²       Physical Exam  Vitals reviewed. Constitutional:       Appearance: Normal appearance. She is well-developed. HENT:      Right Ear: Tympanic membrane and ear canal normal.      Left Ear: Tympanic membrane and ear canal normal.      Nose:      Right Turbinates: Swollen. Left Turbinates: Swollen. Right Sinus: No maxillary sinus tenderness or frontal sinus tenderness. Left Sinus: No maxillary sinus tenderness or frontal sinus tenderness. Mouth/Throat:      Pharynx: Oropharynx is clear. Cardiovascular:      Rate and Rhythm: Normal rate and regular rhythm. Pulses: Normal pulses. Heart sounds: Normal heart sounds. No murmur heard. Pulmonary:      Effort: Pulmonary effort is normal.      Breath sounds: Normal breath sounds. Skin:     General: Skin is warm and dry. Neurological:      Mental Status: She is alert and oriented to person, place, and time. Assessment/Plan    1. Viral URI  Advised OTC decongestant  Nasal spray    Return if symptoms worsen or fail to improve, for unresolved symptoms. This dictation was generated by voice recognition computer software. Although all attempts are made to edit the dictation for accuracy, there may be errors in the transcription that are not intended.

## 2022-11-14 ENCOUNTER — OFFICE VISIT (OUTPATIENT)
Dept: FAMILY MEDICINE CLINIC | Age: 47
End: 2022-11-14
Payer: COMMERCIAL

## 2022-11-14 VITALS
TEMPERATURE: 97.3 F | DIASTOLIC BLOOD PRESSURE: 80 MMHG | HEIGHT: 65 IN | BODY MASS INDEX: 37.65 KG/M2 | SYSTOLIC BLOOD PRESSURE: 130 MMHG | OXYGEN SATURATION: 99 % | HEART RATE: 74 BPM | WEIGHT: 226 LBS

## 2022-11-14 DIAGNOSIS — Z12.11 SCREEN FOR COLON CANCER: ICD-10-CM

## 2022-11-14 DIAGNOSIS — Z12.31 ENCOUNTER FOR SCREENING MAMMOGRAM FOR MALIGNANT NEOPLASM OF BREAST: ICD-10-CM

## 2022-11-14 DIAGNOSIS — Z00.00 WELL ADULT EXAM: Primary | ICD-10-CM

## 2022-11-14 DIAGNOSIS — E78.2 MIXED HYPERLIPIDEMIA: ICD-10-CM

## 2022-11-14 DIAGNOSIS — R10.11 RUQ PAIN: ICD-10-CM

## 2022-11-14 PROCEDURE — 99396 PREV VISIT EST AGE 40-64: CPT | Performed by: FAMILY MEDICINE

## 2022-11-14 RX ORDER — SIMVASTATIN 20 MG
TABLET ORAL
Qty: 90 TABLET | Refills: 3 | Status: SHIPPED | OUTPATIENT
Start: 2022-11-14

## 2022-11-14 RX ORDER — PHENOL 1.4 %
1 AEROSOL, SPRAY (ML) MUCOUS MEMBRANE DAILY
COMMUNITY

## 2022-11-14 NOTE — PROGRESS NOTES
Here for annual physical.    Dental: up-to-date  Eye: up-to-date    Colonoscopy: due    Pap: up-to-date  Mammo: scheduled for next Monday, ordered      Exercise: Walking - 4 times a week, 1-2 miles  Diet: 3 meals daily with healthy diet. Living Will: thinks so,   Additional information provided    [facility] Scores 10/25/2022 10/26/2020 10/22/2019 4/1/2019 10/8/2018 8/14/2017   PHQ2 Score 0 0 0 0 0 0   PHQ9 Score 0 0 0 0 0 0     Taking simvastatin reg, no SE. Going to get labs on saturday    About 2 months ago had episode RUQ pain, some heartburn, lasted few days off and on then went away, nothing since. HM reviewed with pt    Patient's medications, allergies, past medical, surgical, social and family histories were reviewed and updated in the EHR as appropriate. Vitals:    11/14/22 1540   BP: 130/80   Site: Left Upper Arm   Position: Sitting   Cuff Size: Large Adult   Pulse: 74   Temp: 97.3 °F (36.3 °C)   TempSrc: Infrared   SpO2: 99%   Weight: 226 lb (102.5 kg)   Height: 5' 5\" (1.651 m)     Wt Readings from Last 3 Encounters:   11/14/22 226 lb (102.5 kg)   10/25/22 222 lb (100.7 kg)   11/01/21 234 lb (106.1 kg)     Body mass index is 37.61 kg/m². GENERAL Alert and oriented x 4 NAD, affect appropriate and obese, well hydrated, well developed.   NECK:supple and non tender without mass, no thyromegaly or thyroid nodules, no cervical lymphadenopathy  LUNG:clear to auscultation bilaterally with normal respiratory effort  CV: Normal heart sounds, regular rate and rhythm without murmurs  EXTREMETY: no loss of hair, no edema, normal pedal pulses bilaterally  NEURO: CN grossly intact, moving all extremities equally, no gross deficits          ASSESSMENT AND PLAN:       Chantale Gonzales was seen today for annual exam.    Diagnoses and all orders for this visit:    Well adult exam  Recommended screenings discussed and ordered if patient agreed  Recommended vaccinations discussed and ordered if patient agreed  Encouraged healthy diet   Encouraged regular exercise and maintaining a healthy weight  Discussed living will/healthcare POA and encouraged to get if doesn't have. Mixed hyperlipidemia  -Stable, continue current medications. RUQ pain  ? GB, none recently, monitor, if returns consider u/s    Screen for colon cancer  -     AFL - Juan Abraham MD, Gastroenterology, [address] [place] [address]     Encounter for screening mammogram for malignant neoplasm of breast  -     [facility] DIGITAL SCREEN W OR WO CAD BILATERAL; Future    Other orders  -     simvastatin (ZOCOR) 20 MG tablet; TAKE 1 TABLET BY MOUTH EVERY NIGHT          No follow-ups on file.          Portions of Note per  Ying Lorenzo, CMA AAMA with corrections and edits per Tushar Isidro MD.  I agree with entirety of note and was present and performed history and physical.  I also confirm that the note above accurately reflects all work, treatment, procedures, and medical decision making performed by me, Tushar Isidro MD

## 2022-11-14 NOTE — PATIENT INSTRUCTIONS
--Bring in copy of living will and healthcare power of  for your chart. --Call to schedule your colonoscopy  --You can also request an appointment on their website:  [email or web address] [facility], [address]  [phone]    [facility], [address] [place] [address]  Phone: [phone]       --Please bring in a copy of your living will and healthcare power of  to put in your chart. --Link to forms:   [email or web address]      Advance Directives and DNR    Decision making at the end of life is difficult for patients, families and health care providers. Since the early 1990s, a number of forms have been developed to help people express their wishes in advance. What are \"advance directives\"? Advance directives are documents that can help you remain in charge of your health care even after you can no longer make decisions for yourself. The two most common forms of written advance directives are the living will and durable power of  for healthcare. Some people seek an s services to complete these documents; however this is not required. You can complete these documents yourself and have them either notarized or witnessed by two people who are over 25 and not related to you by blood or marriage. What is a \"living will\"? A living will is a document that tells your doctor how you want to be treated if when you are determined to be terminally ill or permanently unconscious and you cannot make decisions for yourself. You can use a living will if you want to avoid life-prolonging treatments such as cardiopulmonary resuscitation (CPR), kidney dialysis or breathing machines. You can use your living will to tell your doctor that you just want to be pain free at the end of our life.  In [place], the living will is sometimes called a \"declaration\". A living will form can be obtained from attorneys, [employer], and healthcare facilities. This signed form must be notarized or witnessed. What is a \"durable power of  for healthcare\"? A medical power of  (medical POA) is another type of advance directive that allows you to name a person to make health care decisions for you if and when you become unable to make them for yourself. The person you name to make decisions on your behalf is some times called your health care surrogate, agent, proxy or -in-fact. The person who holds your medical POA can respond to medical situations you might not have anticipated and make decisions for you empowered by knowledge of your values and wishes. The medical POA form can be obtained from attorneys, [employer], and healthcare facilities. This signed form must be notarized or witnessed. The medical POA document is different from the power of  form that authorizes someone to make financial transactions for you. What is cardiopulmonary resuscitation (CPR)? CPR is a technique useful in many emergencies, including heart attack or near drowning, in which someone's breathing or heartbeat has stopped. CPR may include chest compression, mouth-to-mouth or other rescue breathing and/or electric shock. What is a DNR -Comfort Care form (a.k.a. [facility])? DNR means do not resuscitate. A DNR is a medical order given by a physician or other legally authorized prescriber. It addresses the various methods used to revive people whose hearts have stopped beating /or who have stopped breathing. If a person has a [facility] order, he will receive care that eases pain and suffering but no cardio-pulmonary resuscitation (CPR) to save or prolong life. The [facility] becomes active as soon as it is signed by the doctor or advanced practice nurse.  The [facility] is a standard form which can be obtained from the [address] or healthcare facilities. What is DNR Comfort Care - Arrest (a.k.a. [address] [facility])? The [address] is similar to the [facility] but it only becomes active if and when the person has a cardiac and/or respiratory arrest (i.e. the person stops breathing or his heart stops beating). The [address] is a standard form which can be obtained from the [facility] facilities.

## 2022-11-20 LAB
A/G RATIO: 1.8 (ref 1.2–2.2)
ALBUMIN SERPL-MCNC: 4.4 G/DL (ref 3.8–4.8)
ALP BLD-CCNC: 65 IU/L (ref 44–121)
ALT SERPL-CCNC: 27 IU/L (ref 0–32)
AST SERPL-CCNC: 26 IU/L (ref 0–40)
BILIRUB SERPL-MCNC: 0.5 MG/DL (ref 0–1.2)
BUN / CREAT RATIO: 19 (ref 9–23)
BUN BLDV-MCNC: 15 MG/DL (ref 6–24)
CALCIUM SERPL-MCNC: 9.5 MG/DL (ref 8.7–10.2)
CHLORIDE BLD-SCNC: 102 MMOL/L (ref 96–106)
CHOLESTEROL, TOTAL: 209 MG/DL (ref 100–199)
CO2: 25 MMOL/L (ref 20–29)
COMMENT: ABNORMAL
CREAT SERPL-MCNC: 0.77 MG/DL (ref 0.57–1)
ESTIMATED GLOMERULAR FILTRATION RATE CREATININE EQUATION: 95 ML/MIN/1.73
GLOBULIN: 2.5 G/DL (ref 1.5–4.5)
GLUCOSE BLD-MCNC: 85 MG/DL (ref 70–99)
HDLC SERPL-MCNC: 62 MG/DL
LDL CHOLESTEROL CALCULATED: 128 MG/DL (ref 0–99)
POTASSIUM SERPL-SCNC: 4.7 MMOL/L (ref 3.5–5.2)
SODIUM BLD-SCNC: 139 MMOL/L (ref 134–144)
TOTAL PROTEIN: 6.9 G/DL (ref 6–8.5)
TRIGL SERPL-MCNC: 105 MG/DL (ref 0–149)
VLDLC SERPL CALC-MCNC: 19 MG/DL (ref 5–40)

## 2023-01-20 ENCOUNTER — TELEPHONE (OUTPATIENT)
Dept: FAMILY MEDICINE CLINIC | Age: 48
End: 2023-01-20

## 2023-01-20 DIAGNOSIS — R10.11 RUQ PAIN: Primary | ICD-10-CM

## 2023-01-20 NOTE — TELEPHONE ENCOUNTER
Pt was in to see  November 14th, spoke to  regarding Jacinto Reyes Ups\" she had with heart burn and possibly her gallbladder and was told to contact the office if she'd experienced any more of the episodes and Dr would send her for an ultrasound. Pt stated she had a flair up last evening, so she wanted to let the Dr know.

## 2023-01-23 ENCOUNTER — HOSPITAL ENCOUNTER (OUTPATIENT)
Dept: ULTRASOUND IMAGING | Age: 48
Discharge: HOME | End: 2023-01-23
Payer: COMMERCIAL

## 2023-01-23 DIAGNOSIS — R10.11 RUQ PAIN: ICD-10-CM

## 2023-01-23 PROCEDURE — 76705 ECHO EXAM OF ABDOMEN: CPT

## 2023-01-24 PROBLEM — K76.0 FATTY LIVER: Status: ACTIVE | Noted: 2023-01-24

## 2023-04-26 ENCOUNTER — TELEPHONE (OUTPATIENT)
Dept: FAMILY MEDICINE CLINIC | Age: 48
End: 2023-04-26

## 2023-04-26 DIAGNOSIS — E66.09 CLASS 2 OBESITY DUE TO EXCESS CALORIES WITHOUT SERIOUS COMORBIDITY WITH BODY MASS INDEX (BMI) OF 38.0 TO 38.9 IN ADULT: Primary | ICD-10-CM

## 2023-08-23 ENCOUNTER — OFFICE VISIT (OUTPATIENT)
Dept: BARIATRICS/WEIGHT MGMT | Age: 48
End: 2023-08-23

## 2023-08-23 VITALS
SYSTOLIC BLOOD PRESSURE: 122 MMHG | DIASTOLIC BLOOD PRESSURE: 78 MMHG | HEIGHT: 65 IN | HEART RATE: 72 BPM | OXYGEN SATURATION: 97 % | BODY MASS INDEX: 38.25 KG/M2 | TEMPERATURE: 97.6 F | WEIGHT: 229.6 LBS

## 2023-08-23 DIAGNOSIS — E66.9 OBESITY (BMI 30-39.9): Primary | ICD-10-CM

## 2023-08-23 PROCEDURE — NBSRV NON-BILLABLE SERVICE

## 2023-10-18 ENCOUNTER — TELEPHONE (OUTPATIENT)
Dept: FAMILY MEDICINE CLINIC | Age: 48
End: 2023-10-18

## 2023-10-18 NOTE — TELEPHONE ENCOUNTER
Patient informed. States that she was reading on the Paxlovid and it had some interaction in regards to possible messing up a persons liver. She said she had US that showed a fatty liver. She is wanting to know if this will effect the Liver anymore if she takes it.

## 2023-10-18 NOTE — TELEPHONE ENCOUNTER
Pt took an at-home Covid test this morning and tested positive. She's been experiencing body aches, headache, congestion, and cough. Would like to get the Paxlovid. Please advise. ..     [facility] [address] [place] [address], [address] [place] [place] [address] Drive -  [phone]

## 2023-10-25 ENCOUNTER — TELEPHONE (OUTPATIENT)
Dept: FAMILY MEDICINE CLINIC | Age: 48
End: 2023-10-25

## 2023-10-25 DIAGNOSIS — Z00.00 WELL ADULT EXAM: Primary | ICD-10-CM

## 2023-10-25 DIAGNOSIS — E78.2 MIXED HYPERLIPIDEMIA: ICD-10-CM

## 2023-10-25 NOTE — TELEPHONE ENCOUNTER
Patient's  called and asks that if he or is wife Yamilka need any labs done before their Nov 1 appointments that they be ordered and printed off so they can pick them up on Friday.     Please advise

## 2023-10-29 LAB
A/G RATIO: 1.7 (ref 1.2–2.2)
ALBUMIN SERPL-MCNC: 4.3 G/DL (ref 3.9–4.9)
ALP BLD-CCNC: 49 IU/L (ref 44–121)
ALT SERPL-CCNC: 59 IU/L (ref 0–32)
AST SERPL-CCNC: 25 IU/L (ref 0–40)
BILIRUB SERPL-MCNC: 0.6 MG/DL (ref 0–1.2)
BUN / CREAT RATIO: 16 (ref 9–23)
BUN BLDV-MCNC: 12 MG/DL (ref 6–24)
CALCIUM SERPL-MCNC: 9 MG/DL (ref 8.7–10.2)
CHLORIDE BLD-SCNC: 101 MMOL/L (ref 96–106)
CHOLESTEROL, TOTAL: 221 MG/DL (ref 100–199)
CO2: 23 MMOL/L (ref 20–29)
COMMENT: ABNORMAL
CREAT SERPL-MCNC: 0.75 MG/DL (ref 0.57–1)
ESTIMATED GLOMERULAR FILTRATION RATE CREATININE EQUATION: 98 ML/MIN/1.73
GLOBULIN: 2.5 G/DL (ref 1.5–4.5)
GLUCOSE BLD-MCNC: 88 MG/DL (ref 70–99)
HDLC SERPL-MCNC: 54 MG/DL
LDL CHOLESTEROL CALCULATED: 134 MG/DL (ref 0–99)
POTASSIUM SERPL-SCNC: 4.4 MMOL/L (ref 3.5–5.2)
SODIUM BLD-SCNC: 138 MMOL/L (ref 134–144)
TOTAL PROTEIN: 6.8 G/DL (ref 6–8.5)
TRIGL SERPL-MCNC: 185 MG/DL (ref 0–149)
VLDLC SERPL CALC-MCNC: 33 MG/DL (ref 5–40)

## 2023-11-01 ENCOUNTER — OFFICE VISIT (OUTPATIENT)
Dept: FAMILY MEDICINE CLINIC | Age: 48
End: 2023-11-01
Payer: COMMERCIAL

## 2023-11-01 VITALS
HEART RATE: 68 BPM | DIASTOLIC BLOOD PRESSURE: 80 MMHG | OXYGEN SATURATION: 100 % | WEIGHT: 227.4 LBS | BODY MASS INDEX: 37.89 KG/M2 | TEMPERATURE: 97 F | SYSTOLIC BLOOD PRESSURE: 124 MMHG | HEIGHT: 65 IN

## 2023-11-01 DIAGNOSIS — E66.01 CLASS 2 SEVERE OBESITY DUE TO EXCESS CALORIES WITH SERIOUS COMORBIDITY AND BODY MASS INDEX (BMI) OF 37.0 TO 37.9 IN ADULT (HCC): ICD-10-CM

## 2023-11-01 DIAGNOSIS — E78.2 MIXED HYPERLIPIDEMIA: ICD-10-CM

## 2023-11-01 DIAGNOSIS — K76.0 FATTY LIVER: ICD-10-CM

## 2023-11-01 DIAGNOSIS — Z00.00 WELL ADULT EXAM: Primary | ICD-10-CM

## 2023-11-01 DIAGNOSIS — Z12.31 ENCOUNTER FOR SCREENING MAMMOGRAM FOR MALIGNANT NEOPLASM OF BREAST: ICD-10-CM

## 2023-11-01 PROCEDURE — 99214 OFFICE O/P EST MOD 30 MIN: CPT | Performed by: FAMILY MEDICINE

## 2023-11-01 PROCEDURE — 99396 PREV VISIT EST AGE 40-64: CPT | Performed by: FAMILY MEDICINE

## 2023-11-01 RX ORDER — NORGESTIMATE AND ETHINYL ESTRADIOL 0.25-0.035
KIT ORAL
COMMUNITY
Start: 2023-10-11

## 2023-11-01 RX ORDER — SIMVASTATIN 20 MG
TABLET ORAL
Qty: 90 TABLET | Refills: 3 | Status: SHIPPED | OUTPATIENT
Start: 2023-11-01

## 2023-11-01 SDOH — ECONOMIC STABILITY: INCOME INSECURITY: HOW HARD IS IT FOR YOU TO PAY FOR THE VERY BASICS LIKE FOOD, HOUSING, MEDICAL CARE, AND HEATING?: NOT HARD AT ALL

## 2023-11-01 SDOH — ECONOMIC STABILITY: FOOD INSECURITY: WITHIN THE PAST 12 MONTHS, YOU WORRIED THAT YOUR FOOD WOULD RUN OUT BEFORE YOU GOT MONEY TO BUY MORE.: NEVER TRUE

## 2023-11-01 SDOH — ECONOMIC STABILITY: FOOD INSECURITY: WITHIN THE PAST 12 MONTHS, THE FOOD YOU BOUGHT JUST DIDN'T LAST AND YOU DIDN'T HAVE MONEY TO GET MORE.: NEVER TRUE

## 2023-11-01 SDOH — ECONOMIC STABILITY: HOUSING INSECURITY
IN THE LAST 12 MONTHS, WAS THERE A TIME WHEN YOU DID NOT HAVE A STEADY PLACE TO SLEEP OR SLEPT IN A SHELTER (INCLUDING NOW)?: NO

## 2023-11-01 ASSESSMENT — PATIENT HEALTH QUESTIONNAIRE - PHQ9
SUM OF ALL RESPONSES TO PHQ QUESTIONS 1-9: 0
SUM OF ALL RESPONSES TO PHQ QUESTIONS 1-9: 0
2. FEELING DOWN, DEPRESSED OR HOPELESS: 0
SUM OF ALL RESPONSES TO PHQ9 QUESTIONS 1 & 2: 0
SUM OF ALL RESPONSES TO PHQ QUESTIONS 1-9: 0
1. LITTLE INTEREST OR PLEASURE IN DOING THINGS: 0
SUM OF ALL RESPONSES TO PHQ QUESTIONS 1-9: 0

## 2024-03-08 ENCOUNTER — TELEPHONE (OUTPATIENT)
Dept: FAMILY MEDICINE CLINIC | Age: 49
End: 2024-03-08

## 2024-03-08 DIAGNOSIS — G47.30 SLEEP APNEA, UNSPECIFIED TYPE: Primary | ICD-10-CM

## 2024-03-08 NOTE — TELEPHONE ENCOUNTER
Pt called requesting a referral for a sleep study due to possible sleep apnea.     Please advise...

## 2024-07-22 ASSESSMENT — SLEEP AND FATIGUE QUESTIONNAIRES
HOW LIKELY ARE YOU TO NOD OFF OR FALL ASLEEP WHILE SITTING AND TALKING TO SOMEONE: WOULD NEVER DOZE
HOW LIKELY ARE YOU TO NOD OFF OR FALL ASLEEP WHILE WATCHING TV: SLIGHT CHANCE OF DOZING
HOW LIKELY ARE YOU TO NOD OFF OR FALL ASLEEP WHILE LYING DOWN TO REST IN THE AFTERNOON WHEN CIRCUMSTANCES PERMIT: HIGH CHANCE OF DOZING
HOW LIKELY ARE YOU TO NOD OFF OR FALL ASLEEP IN A CAR, WHILE STOPPED FOR A FEW MINUTES IN TRAFFIC: WOULD NEVER DOZE
HOW LIKELY ARE YOU TO NOD OFF OR FALL ASLEEP WHILE SITTING AND READING: MODERATE CHANCE OF DOZING
HOW LIKELY ARE YOU TO NOD OFF OR FALL ASLEEP WHEN YOU ARE A PASSENGER IN A CAR FOR AN HOUR WITHOUT A BREAK: SLIGHT CHANCE OF DOZING
HOW LIKELY ARE YOU TO NOD OFF OR FALL ASLEEP WHILE SITTING INACTIVE IN A PUBLIC PLACE: WOULD NEVER DOZE
HOW LIKELY ARE YOU TO NOD OFF OR FALL ASLEEP WHILE SITTING QUIETLY AFTER LUNCH WITHOUT ALCOHOL: SLIGHT CHANCE OF DOZING

## 2024-07-24 ENCOUNTER — OFFICE VISIT (OUTPATIENT)
Dept: PULMONOLOGY | Age: 49
End: 2024-07-24
Payer: COMMERCIAL

## 2024-07-24 VITALS
DIASTOLIC BLOOD PRESSURE: 79 MMHG | WEIGHT: 211.6 LBS | OXYGEN SATURATION: 99 % | BODY MASS INDEX: 35.25 KG/M2 | SYSTOLIC BLOOD PRESSURE: 120 MMHG | HEIGHT: 65 IN | HEART RATE: 72 BPM

## 2024-07-24 DIAGNOSIS — G47.10 HYPERSOMNIA: ICD-10-CM

## 2024-07-24 DIAGNOSIS — E66.01 SEVERE OBESITY (HCC): Primary | ICD-10-CM

## 2024-07-24 DIAGNOSIS — R53.83 FATIGUE, UNSPECIFIED TYPE: ICD-10-CM

## 2024-07-24 PROCEDURE — 99204 OFFICE O/P NEW MOD 45 MIN: CPT | Performed by: STUDENT IN AN ORGANIZED HEALTH CARE EDUCATION/TRAINING PROGRAM

## 2024-07-24 NOTE — PROGRESS NOTES
[facility]  Sleep Medicine  [facility], [address]  [place] [address]    Chief Complaint   Patient presents with    Daytime Sleepiness       Madhuri Serna is a 49 y.o. female who comes in for sleep evaluation.  Her complaints include daytime sleepiness, daytime fatigue, gasping for air at night, non refreshed sleep, and easily falling asleep during idle situations. Onset of symptoms was several years ago.     Pertinent PMHx includes: HLD, severe obesity    She goes to bed at 9pm. She falls asleep in variable durations of time (sometimes right away) .  She awakens a 0-2 times per night. She awakens at 5am. The average total amount of sleep is about 7 hours per night. She does not use sleep aid/s. She does not take daytime naps due to time limitations. She describes the symptoms as daytime sleepiness, fatigue, witnessed apneas, waking up in the middle of the night gasping for air, non refreshed sleep , and morning dry mouth.  She does not have symptoms that fulfill the criteria for restless legs syndrome. She has not dozed off while driving. She denies recent significant weight gain. Previous evaluation and treatment has included: none.    Family hx of sleep disorders: not to patient's knowledge  Caffeinated drinks/day: 1 pop per day  Alcohol intake/day/week: none  Occupation:       DATA REVIEWED TODAY:  Warren & Insomnia Severity Index scores      7/22/2024     5:48 PM   Sleep Medicine   Sitting and reading 2   Watching TV 1   Sitting, inactive in a public place (e.g. a theatre or a meeting) 0   As a passenger in a car for an hour without a break 1   Lying down to rest in the afternoon when circumstances permit 3   Sitting and talking to someone 0   Sitting quietly after a lunch without alcohol 1   In a car, while stopped for a few minutes in traffic 0   Warren Sleepiness Score 8   Neck (Inches) 15.25         7/22/2024     5:49 PM   Insomnia Severity Index (COV)   Difficulty falling

## 2024-07-24 NOTE — PATIENT INSTRUCTIONS
Patient information on the evaluation procedure for sleep apnea:    1. You are going to have a portable sleep study:  This is a home sleep study that will be done to see if you have obstructive sleep apnea. You will have a flow monitor on your nose, belt on your chest and a oxygen/heart rate monitor on your finger. Please do not wear nail polish on day of the study as it will interfere with the oxygen reading probe that is placed on your finger during the study.   Once you receive the device, you will also receive instructions on how to put it on and turn it on.  After 2 nights you will return it.    2. The sleep office will call you with the results a week after the study.     If the study showed that you have obstructive sleep apnea you will be told of the next step in your care. Commonly the recommended treatment is CPAP Therapy. If you agree to this therapy and you receive your CPAP before your next appointment, please bring it with you to your first follow-up appointment.      Patients who have obstructive sleep apnea on the sleep study and have chosen to try CPAP:  A home equipment company will contact you to set you up with a CPAP machine and fit you for a mask.    Please bring your CPAP, the mask and all supplies including the electrical cord with you to all your first follow up appointments with the sleep physician    Please keep trying to use your CPAP until you have seen in the sleep office in follow up as a lot of the problems patients have with CPAP can be addressed and corrected by your sleep provider.    Sleep center contact information:  [facility]: [phone]  [facility]: [phone]             What are the risk factors for Obstructive Sleep Apnea (ANTWAN)?  Obesity  Snoring  Daytime sleepiness  Increasing age  Male gender  Taking sedating medications  Alcohol use  Hypertension  Stroke  Diabetes  Smoking     What is ANTWAN?  People with ANTWAN experience recurrent

## 2024-07-25 ENCOUNTER — TELEPHONE (OUTPATIENT)
Dept: SLEEP CENTER | Age: 49
End: 2024-07-25

## 2024-07-25 NOTE — TELEPHONE ENCOUNTER
Called to schedule a hst per Mehran     Left vm for the pt to return my call     [facility] insurance

## 2024-08-14 ENCOUNTER — HOSPITAL ENCOUNTER (OUTPATIENT)
Dept: SLEEP CENTER | Age: 49
Discharge: HOME | End: 2024-08-14
Payer: COMMERCIAL

## 2024-08-14 DIAGNOSIS — E66.01 SEVERE OBESITY (HCC): ICD-10-CM

## 2024-08-14 DIAGNOSIS — R53.83 FATIGUE, UNSPECIFIED TYPE: ICD-10-CM

## 2024-08-14 DIAGNOSIS — G47.10 HYPERSOMNIA: ICD-10-CM

## 2024-08-14 PROCEDURE — 95806 SLEEP STUDY UNATT&RESP EFFT: CPT

## 2024-08-22 ENCOUNTER — TELEPHONE (OUTPATIENT)
Dept: PULMONOLOGY | Age: 49
End: 2024-08-22

## 2024-08-22 NOTE — TELEPHONE ENCOUNTER
Patient's study is consistent with mild ANTWAN. I will contact her to discuss result and treatment plan.    José Wills MD

## 2024-08-23 NOTE — TELEPHONE ENCOUNTER
I attempted to reach patient by phone but unsuccessfully. I left a vm asking patient to call us back and let us know what's the best time to reach them.    José Wills MD

## 2024-09-05 NOTE — TELEPHONE ENCOUNTER
I spoke to patient regarding sleep test showing mild ANTWAN.  She opted to forego treatment at this time.  She will continue to work on lifestyle modification and weight loss.  No follow-up needed at this time.    José Wills MD

## 2024-10-03 ENCOUNTER — TELEPHONE (OUTPATIENT)
Dept: FAMILY MEDICINE CLINIC | Age: 49
End: 2024-10-03

## 2024-10-03 DIAGNOSIS — E78.2 MIXED HYPERLIPIDEMIA: ICD-10-CM

## 2024-10-03 DIAGNOSIS — Z00.00 WELL ADULT EXAM: Primary | ICD-10-CM

## 2024-10-03 NOTE — TELEPHONE ENCOUNTER
Patient called and would like to have any needed lab orders for her upcoming physical appointment put entered so she can go get them drawn on time.    Please advise

## 2024-10-09 NOTE — TELEPHONE ENCOUNTER
Patient informed.  Orders placed.  Is wanting to know if any of the labs was going to check her liver numbers.  Said she was to redo in spring, but she didn't do.

## 2024-10-20 LAB
ALBUMIN: 4.6 G/DL
ALP BLD-CCNC: 45 U/L
ALT SERPL-CCNC: 23 U/L
ANION GAP SERPL CALCULATED.3IONS-SCNC: NORMAL MMOL/L
AST SERPL-CCNC: 20 U/L
BILIRUB SERPL-MCNC: 0.8 MG/DL
BUN BLDV-MCNC: 16 MG/DL
CALCIUM SERPL-MCNC: 9.3 MG/DL
CHLORIDE BLD-SCNC: 99 MMOL/L
CHOLESTEROL, TOTAL: 250 MG/DL
CHOLESTEROL/HDL RATIO: ABNORMAL
CO2: 20 MMOL/L
CREAT SERPL-MCNC: 0.75 MG/DL
GFR, ESTIMATED: 97
GLUCOSE BLD-MCNC: 85 MG/DL
HDLC SERPL-MCNC: 59 MG/DL (ref 39–?)
LDL CHOLESTEROL: 167
NONHDLC SERPL-MCNC: ABNORMAL MG/DL
POTASSIUM SERPL-SCNC: 4.3 MMOL/L
SODIUM BLD-SCNC: 135 MMOL/L
TOTAL PROTEIN: 7.2 G/DL
TRIGL SERPL-MCNC: 136 MG/DL
VLDLC SERPL CALC-MCNC: 24 MG/DL

## 2024-11-04 ASSESSMENT — PATIENT HEALTH QUESTIONNAIRE - PHQ9
1. LITTLE INTEREST OR PLEASURE IN DOING THINGS: NOT AT ALL
SUM OF ALL RESPONSES TO PHQ QUESTIONS 1-9: 0
SUM OF ALL RESPONSES TO PHQ9 QUESTIONS 1 & 2: 0
2. FEELING DOWN, DEPRESSED OR HOPELESS: NOT AT ALL
SUM OF ALL RESPONSES TO PHQ9 QUESTIONS 1 & 2: 0
1. LITTLE INTEREST OR PLEASURE IN DOING THINGS: NOT AT ALL
2. FEELING DOWN, DEPRESSED OR HOPELESS: NOT AT ALL

## 2024-11-05 NOTE — PROGRESS NOTES
Here for annual physical.    Dental: {UP-TO-DATE/ORDERED:708207513}  Eye: {UP-TO-DATE/ORDERED:804106649}    Colonoscopy: up-to-date    Pap: up-to-date  Mammo: up-to-date    Seatbelt: {RARELY/SOMETIMES/OFTEN/ALWAYS:3488191981}    Exercise:  {desc; exercise:52534}, {ACTIVITIES; EXERCISE:63667}  Do you eat balanced/healthy meals regularly? {YES NO:94190}    Living Will and/or Healthcare POA: {YES / NO:},   {LIVING WILL SCREENIN}        2024     9:28 AM 2023     4:01 PM 10/25/2022     3:26 PM 10/26/2020     8:31 AM 10/22/2019     4:08 PM 2019     4:47 PM 10/8/2018     3:50 PM   PHQ Scores   PHQ2 Score 0 0 0 0 0 0 0   PHQ9 Score 0 0 0 0 0 0 0       HM reviewed with pt    Patient's medications, allergies, past medical, surgical, social and family histories were reviewed and updated in the EHR as appropriate.    There were no vitals filed for this visit.  Wt Readings from Last 3 Encounters:   24 96 kg (211 lb 9.6 oz)   23 103.1 kg (227 lb 6.4 oz)   23 104.1 kg (229 lb 9.6 oz)     There is no height or weight on file to calculate BMI.      GENERAL Alert and oriented x 4 NAD, {gyn gen appearance:508178545}, well hydrated, well developed.  NECK:supple and non tender without mass, no thyromegaly or thyroid nodules, no cervical lymphadenopathy  HEENT: TM clear bilaterally  LUNG:clear to auscultation bilaterally with normal respiratory effort  CV: Normal heart sounds, regular rate and rhythm without murmurs  EXTREMETY: no loss of hair, no edema, normal pedal pulses bilaterally  NEURO: CN grossly intact, moving all extremities equally, no gross deficits          ASSESSMENT AND PLAN:       There are no diagnoses linked to this encounter.        No follow-ups on file.         Portions of Note per  Laurel Bryson CMA AAMA with corrections and edits per Evelina Harris MD.  I agree with entirety of note and was present and performed history and physical.  I also confirm that the note

## 2024-11-06 ENCOUNTER — OFFICE VISIT (OUTPATIENT)
Dept: FAMILY MEDICINE CLINIC | Age: 49
End: 2024-11-06

## 2024-11-06 VITALS
WEIGHT: 202.8 LBS | DIASTOLIC BLOOD PRESSURE: 76 MMHG | SYSTOLIC BLOOD PRESSURE: 120 MMHG | HEIGHT: 65 IN | OXYGEN SATURATION: 99 % | TEMPERATURE: 97.1 F | HEART RATE: 73 BPM | BODY MASS INDEX: 33.79 KG/M2

## 2024-11-06 DIAGNOSIS — M25.552 LEFT HIP PAIN: ICD-10-CM

## 2024-11-06 DIAGNOSIS — K76.0 FATTY LIVER: ICD-10-CM

## 2024-11-06 DIAGNOSIS — E78.2 MIXED HYPERLIPIDEMIA: ICD-10-CM

## 2024-11-06 DIAGNOSIS — Z00.00 WELL ADULT EXAM: Primary | ICD-10-CM

## 2024-11-06 RX ORDER — SIMVASTATIN 20 MG
TABLET ORAL
Qty: 90 TABLET | Refills: 3 | Status: SHIPPED | OUTPATIENT
Start: 2024-11-06

## 2024-11-06 SDOH — ECONOMIC STABILITY: INCOME INSECURITY: HOW HARD IS IT FOR YOU TO PAY FOR THE VERY BASICS LIKE FOOD, HOUSING, MEDICAL CARE, AND HEATING?: NOT HARD AT ALL

## 2024-11-06 SDOH — ECONOMIC STABILITY: FOOD INSECURITY: WITHIN THE PAST 12 MONTHS, THE FOOD YOU BOUGHT JUST DIDN'T LAST AND YOU DIDN'T HAVE MONEY TO GET MORE.: NEVER TRUE

## 2024-11-06 SDOH — ECONOMIC STABILITY: FOOD INSECURITY: WITHIN THE PAST 12 MONTHS, YOU WORRIED THAT YOUR FOOD WOULD RUN OUT BEFORE YOU GOT MONEY TO BUY MORE.: NEVER TRUE

## 2024-11-06 NOTE — PROGRESS NOTES
Here for annual physical.    Dental: up-to-date; Dr. Carmichael ([place])  Eye: up-to-date; Dr. St ([place])    Colonoscopy: up-to-date; completed 2023    Pap: up-to-date  Mammo: up-to-date    Seatbelt: Always    Exercise:  intermittently, walking - 3 currently, would like to get to 5 days  Do you eat balanced/healthy meals regularly? Yes; 18 hour fasts; much better; cut out most carbs, lots of protein, veggies, minimal snacking    Pt not interested in vaccinations.    Living Will and/or Healthcare POA: Potentially have living will; not in system, pt will follow up        11/4/2024     9:28 AM 11/1/2023     4:01 PM 10/25/2022     3:26 PM 10/26/2020     8:31 AM 10/22/2019     4:08 PM 4/1/2019     4:47 PM 10/8/2018     3:50 PM   PHQ Scores   PHQ2 Score 0 0 0 0 0 0 0   PHQ9 Score 0 0 0 0 0 0 0     Mixed HLD  Fatty liver  - simvastatin 20 mg nightly:  - while working on diet, taking inconsistently (~3 nights per week) w hope that would dietary changes would improve cholesterol adequately   - taking nightly since got blood results  - 227 lbs --> 202 lbs    L hip pain  - started 5 months ago  - no trauma; was walking nightly   - along [employer]  - has felt pop on outside of hip while walking slowly sometimes  - after sitting or walking for long periods   - gets stiff   - no pain at rest  - not taking any medication for this  - not interfering with activity  -not interfering with sleep      HM reviewed with pt    Patient's medications, allergies, past medical, surgical, social and family histories were reviewed and updated in the EHR as appropriate.    Vitals:    11/06/24 1601   BP: 120/76   Site: Left Upper Arm   Position: Sitting   Cuff Size: Large Adult   Pulse: 73   Temp: 97.1 °F (36.2 °C)   TempSrc: Infrared   SpO2: 99%   Weight: 92 kg (202 lb 12.8 oz)   Height: 1.638 m (5' 4.5\")     Wt Readings from Last 3 Encounters:   11/06/24 92 kg (202 lb 12.8 oz)   07/24/24 96 kg (211 lb 9.6 oz)   11/01/23 103.1 kg (227 lb 6.4

## 2024-11-06 NOTE — PATIENT INSTRUCTIONS
--Bring in copy of living will and healthcare power of  for your chart.      --Check with pharmacy about getting the shingles vaccine, Shingrix (not Zostavax)

## 2025-04-14 ENCOUNTER — RESULTS FOLLOW-UP (OUTPATIENT)
Dept: FAMILY MEDICINE CLINIC | Age: 50
End: 2025-04-14

## 2025-07-31 ENCOUNTER — OFFICE VISIT (OUTPATIENT)
Dept: FAMILY MEDICINE CLINIC | Age: 50
End: 2025-07-31
Payer: COMMERCIAL

## 2025-07-31 ENCOUNTER — TELEPHONE (OUTPATIENT)
Dept: FAMILY MEDICINE CLINIC | Age: 50
End: 2025-07-31

## 2025-07-31 VITALS — TEMPERATURE: 97.6 F | HEART RATE: 74 BPM | OXYGEN SATURATION: 99 %

## 2025-07-31 DIAGNOSIS — R10.12 ABDOMINAL DISCOMFORT IN LEFT UPPER QUADRANT: Primary | ICD-10-CM

## 2025-07-31 PROCEDURE — 99213 OFFICE O/P EST LOW 20 MIN: CPT | Performed by: FAMILY MEDICINE

## 2025-07-31 ASSESSMENT — ENCOUNTER SYMPTOMS
NAUSEA: 0
DIARRHEA: 0
FLATUS: 0
ABDOMINAL PAIN: 1
CONSTIPATION: 0

## 2025-07-31 NOTE — PROGRESS NOTES
SUBJECTIVE:    Madhuri Serna is a 50 y.o. female who presents for a follow up visit.    Chief Complaint   Patient presents with    Abdominal Cramping     LUQ discomfort that is mild and comes and goes        Abdominal Pain  This is a new problem. The current episode started yesterday. The onset quality is gradual. The problem occurs intermittently. The problem has been waxing and waning. The pain is located in the LUQ. The patient is experiencing no pain. The quality of the pain is cramping. Pertinent negatives include no anorexia, constipation, diarrhea, dysuria, fever, flatus, frequency, headaches, myalgias or nausea. The pain is aggravated by certain positions. The pain is relieved by Certain positions. She has tried nothing for the symptoms.        Patient's medications, allergies, past medical,surgical, social and family histories were reviewed and updated as appropriate.     Past Medical History:   Diagnosis Date    Hyperlipidemia     Liver disease      No past surgical history on file.  Family History   Problem Relation Age of Onset    No Known Problems Mother     High Cholesterol Father     Prostate Cancer Father     Cancer Father 73        Brain    High Blood Pressure Brother     No Known Problems Son     No Known Problems Son     No Known Problems Daughter     No Known Problems Daughter     Diabetes Neg Hx     Heart Disease Neg Hx      Social History     Tobacco Use    Smoking status: Former     Current packs/day: 0.00     Average packs/day: 0.3 packs/day for 3.0 years (0.8 ttl pk-yrs)     Types: Cigarettes     Start date: 1990     Quit date: 1993     Years since quittin.6    Smokeless tobacco: Never   Substance Use Topics    Alcohol use: No      No Known Allergies  Current Outpatient Medications on File Prior to Visit   Medication Sig Dispense Refill    simvastatin (ZOCOR) 20 MG tablet TAKE 1 TABLET BY MOUTH EVERY NIGHT 90 tablet 3    ESTARYLLA 0.25-35 MG-MCG per tablet       UNABLE TO FIND

## 2025-07-31 NOTE — TELEPHONE ENCOUNTER
Having a weird feeling on upper left side of stomach.  She stated it's not a pain but just a weird feeling, feels like something may be inflamed.  She is supposed to go on vacation at 2am tonight and wants to make sure see doesn't need to be seen or go to ED.  Please advise.

## 2025-09-03 ENCOUNTER — TELEPHONE (OUTPATIENT)
Dept: FAMILY MEDICINE CLINIC | Age: 50
End: 2025-09-03